# Patient Record
Sex: FEMALE | Race: ASIAN | NOT HISPANIC OR LATINO
[De-identification: names, ages, dates, MRNs, and addresses within clinical notes are randomized per-mention and may not be internally consistent; named-entity substitution may affect disease eponyms.]

---

## 2019-08-22 PROBLEM — Z00.00 ENCOUNTER FOR PREVENTIVE HEALTH EXAMINATION: Status: ACTIVE | Noted: 2019-08-22

## 2019-08-26 ENCOUNTER — APPOINTMENT (OUTPATIENT)
Dept: OTOLARYNGOLOGY | Facility: CLINIC | Age: 39
End: 2019-08-26
Payer: COMMERCIAL

## 2019-08-26 VITALS
HEART RATE: 94 BPM | HEIGHT: 62 IN | BODY MASS INDEX: 29.44 KG/M2 | WEIGHT: 160 LBS | TEMPERATURE: 98.3 F | DIASTOLIC BLOOD PRESSURE: 88 MMHG | SYSTOLIC BLOOD PRESSURE: 139 MMHG

## 2019-08-26 DIAGNOSIS — R22.0 LOCALIZED SWELLING, MASS AND LUMP, HEAD: ICD-10-CM

## 2019-08-26 DIAGNOSIS — Z86.2 PERSONAL HISTORY OF DISEASES OF THE BLOOD AND BLOOD-FORMING ORGANS AND CERTAIN DISORDERS INVOLVING THE IMMUNE MECHANISM: ICD-10-CM

## 2019-08-26 DIAGNOSIS — R20.9 UNSPECIFIED DISTURBANCES OF SKIN SENSATION: ICD-10-CM

## 2019-08-26 PROCEDURE — 99202 OFFICE O/P NEW SF 15 MIN: CPT | Mod: 25

## 2019-08-26 PROCEDURE — 31575 DIAGNOSTIC LARYNGOSCOPY: CPT

## 2019-08-27 PROBLEM — Z86.2 HISTORY OF ANEMIA: Status: RESOLVED | Noted: 2019-08-27 | Resolved: 2019-08-27

## 2019-08-27 PROBLEM — R22.0 MASS OF RIGHT SUBMANDIBULAR REGION: Status: ACTIVE | Noted: 2019-08-26

## 2019-08-27 NOTE — PHYSICAL EXAM
[de-identified] : palpable LN in right submandibular region [de-identified] : normal bilateral TMs, but significant right EAC senistivity [Normal] : lingual tonsils are normal [Midline] : trachea located in midline position [Laryngoscopy Performed] : laryngoscopy was performed, see procedure section for findings [de-identified] : with a few tonsilar stones [de-identified] : no decreased movement or strength in trigeminal nerve distribution of branches V1/V2/V3, but decreased sensation in V1/V2/V3

## 2019-08-27 NOTE — REVIEW OF SYSTEMS
[Ear Pain] : no ear pain [Hearing Loss] : no hearing loss [As Noted in HPI] : as noted in HPI [Negative] : Constitutional [de-identified] : right ear fullness [de-identified] : right tongue paresthesias and decreased taste [FreeTextEntry4] : right facial paresthesias

## 2019-08-27 NOTE — HISTORY OF PRESENT ILLNESS
[de-identified] : 39F w/ no significant PMH, who presents with 3 weeks of right submandibular swelling and pain and concomitant right facial paresthesias. Patient explains that she first noted the right facial paresthesias 3 weeks ago and then a week ago developed pain and swelling of the right submandibular glad for which she went to urgent care where she had no imaging done, but was sent home on Augmentin. She reports that the Augmentin improved the submandibular swelling and pain, but not the paresthesias. She denies any f/c, recent URI, trauma to the face.

## 2019-08-27 NOTE — ASSESSMENT
[FreeTextEntry1] : 39F w/ no PMH who presents with 3 weeks of trigeminal distribution paresthesias and submandibular gland vs node swelling. \par \par Plan:\par - MRI O/F/N\par - f/u after MRI scan

## 2019-09-03 ENCOUNTER — OTHER (OUTPATIENT)
Age: 39
End: 2019-09-03